# Patient Record
Sex: MALE
[De-identification: names, ages, dates, MRNs, and addresses within clinical notes are randomized per-mention and may not be internally consistent; named-entity substitution may affect disease eponyms.]

---

## 2019-03-01 ENCOUNTER — APPOINTMENT (OUTPATIENT)
Dept: FAMILY MEDICINE | Facility: CLINIC | Age: 19
End: 2019-03-01
Payer: COMMERCIAL

## 2019-03-01 VITALS
RESPIRATION RATE: 15 BRPM | SYSTOLIC BLOOD PRESSURE: 130 MMHG | WEIGHT: 150 LBS | DIASTOLIC BLOOD PRESSURE: 90 MMHG | TEMPERATURE: 98 F | HEIGHT: 68 IN | BODY MASS INDEX: 22.73 KG/M2 | OXYGEN SATURATION: 98 % | HEART RATE: 84 BPM

## 2019-03-01 DIAGNOSIS — Z86.59 PERSONAL HISTORY OF OTHER MENTAL AND BEHAVIORAL DISORDERS: ICD-10-CM

## 2019-03-01 DIAGNOSIS — Z83.438 FAMILY HISTORY OF OTHER DISORDER OF LIPOPROTEIN METABOLISM AND OTHER LIPIDEMIA: ICD-10-CM

## 2019-03-01 DIAGNOSIS — Z80.3 FAMILY HISTORY OF MALIGNANT NEOPLASM OF BREAST: ICD-10-CM

## 2019-03-01 DIAGNOSIS — Z87.891 PERSONAL HISTORY OF NICOTINE DEPENDENCE: ICD-10-CM

## 2019-03-01 DIAGNOSIS — Z11.3 ENCOUNTER FOR SCREENING FOR INFECTIONS WITH A PREDOMINANTLY SEXUAL MODE OF TRANSMISSION: ICD-10-CM

## 2019-03-01 DIAGNOSIS — R03.0 ELEVATED BLOOD-PRESSURE READING, W/OUT DIAGNOSIS OF HYPERTENSION: ICD-10-CM

## 2019-03-01 PROCEDURE — 99385 PREV VISIT NEW AGE 18-39: CPT

## 2019-03-03 PROBLEM — R03.0 BLOOD PRESSURE ELEVATED WITHOUT HISTORY OF HTN: Status: ACTIVE | Noted: 2019-03-03

## 2019-03-03 NOTE — HISTORY OF PRESENT ILLNESS
[de-identified] : Pt in office for CPE. Pt was dx w/ ADD 5 yrs ago. Pt used to be on adderall in the past but stopped due to AEs. Pt has had depression in the past, was on prozac in the past for 1 yr. Denies CP, palpitations, dyspnea, n/v.\par Exsmoker a few cig/day x 4 yrs, quit 2017. Vaping daily.  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 Sexually active 1-2 partners in past yr. Requests STD testing\par Declines flu vaccine or tdap vaccine.

## 2019-03-03 NOTE — ASSESSMENT
[FreeTextEntry1] : Elevated BP w/o hx of htn - Advised lifestyle modifications, f/u in 3-6 months for BP recheck. Start medication if still elevated at that time\par hx depression - controlled right now. If symptoms return advised pt to f/u in office\par Nicotine dependence - Vaping cessation discussed with pt for 5 minutes. Risks of vaping including development of pulmonary diseases and/or possible development of cancer were understood by the patient. Advised pt to wean off nicotine content in vape cartridge\par Healthy diet and regular exercise regimen discussed w/ pt.\par Screening labs ordered\par STD screen ordered\par Any questions call office

## 2020-01-09 ENCOUNTER — APPOINTMENT (OUTPATIENT)
Dept: FAMILY MEDICINE | Facility: CLINIC | Age: 20
End: 2020-01-09

## 2020-06-04 ENCOUNTER — APPOINTMENT (OUTPATIENT)
Dept: FAMILY MEDICINE | Facility: CLINIC | Age: 20
End: 2020-06-04
Payer: COMMERCIAL

## 2020-06-04 PROCEDURE — 99213 OFFICE O/P EST LOW 20 MIN: CPT | Mod: 95

## 2020-06-04 NOTE — ASSESSMENT
[FreeTextEntry1] : adult ADHD - start Adderall ER 10mg q day prn. Possible adverse effects discussed with pt.  checked. f/u in 1-2 months\par \par I have spent 15 minutes of time during this encounter including face to face time with patient and review of chart. >50% of time was spent counseling and/or coordinating care for above problems.

## 2020-06-04 NOTE — HISTORY OF PRESENT ILLNESS
[Home] : at home, [unfilled] , at the time of the visit. [Verbal consent obtained from patient] : the patient, [unfilled] [Medical Office: ([facility])___] : at the medical office located in  [FreeTextEntry8] : Pt has hx of ADD dx in middle school. Pt had stopped taking adderall about 6 years ago. Pt tolerated medication well overall but sometimes felt emotionally "numb" and had some decreased appetite. Recently at home due to COVID pandemic and pt unable to complete tasks at home or get started on projects. Pt feels he would benefit from restarting adderall again. Denies CP, palpitations, dyspnea, n/v

## 2021-01-26 ENCOUNTER — APPOINTMENT (OUTPATIENT)
Dept: FAMILY MEDICINE | Facility: CLINIC | Age: 21
End: 2021-01-26
Payer: COMMERCIAL

## 2021-01-26 PROCEDURE — 99213 OFFICE O/P EST LOW 20 MIN: CPT | Mod: 95

## 2021-01-26 RX ORDER — DEXTROAMPHETAMINE SACCHARATE, AMPHETAMINE ASPARTATE, DEXTROAMPHETAMINE SULFATE AND AMPHETAMINE SULFATE 2.5; 2.5; 2.5; 2.5 MG/1; MG/1; MG/1; MG/1
10 TABLET ORAL DAILY
Qty: 30 | Refills: 0 | Status: ACTIVE | COMMUNITY
Start: 2021-01-26 | End: 1900-01-01

## 2021-01-26 RX ORDER — HYDROCORTISONE 25 MG/ML
2.5 LOTION TOPICAL TWICE DAILY
Qty: 1 | Refills: 1 | Status: ACTIVE | COMMUNITY
Start: 2021-01-26 | End: 1900-01-01

## 2021-01-26 RX ORDER — CLOBETASOL PROPIONATE 0.5 MG/G
0.05 CREAM TOPICAL
Qty: 1 | Refills: 0 | Status: ACTIVE | COMMUNITY
Start: 2021-01-26 | End: 1900-01-01

## 2021-01-26 RX ORDER — DEXTROAMPHETAMINE SACCHARATE, AMPHETAMINE ASPARTATE MONOHYDRATE, DEXTROAMPHETAMINE SULFATE AND AMPHETAMINE SULFATE 2.5; 2.5; 2.5; 2.5 MG/1; MG/1; MG/1; MG/1
10 CAPSULE, EXTENDED RELEASE ORAL
Qty: 30 | Refills: 0 | Status: COMPLETED | COMMUNITY
Start: 2020-06-04 | End: 2021-01-26

## 2021-01-26 NOTE — HISTORY OF PRESENT ILLNESS
[Home] : at home, [unfilled] , at the time of the visit. [Medical Office: ([facility])___] : at the medical office located in  [Verbal consent obtained from patient] : the patient, [unfilled] [FreeTextEntry8] : Pt c/o skin inflammation/irritation x 2-3 weeks. Pt has been having inc flaking for past 2-3 weeks on ears, shoulders, hands, scalp. Skin has been very pruritic. Pt applying aquaphor otc cortisone 1% which helps slightly. Pt has hx of eczema. \par \par Pt for f/u of ADD. Pt tolerating current dose of Adderall well. Pt doing well concentrating when doing work. Denies CP, palpitations, HA, tremor, anorexia.

## 2021-01-26 NOTE — ASSESSMENT
[FreeTextEntry1] : eczema - clobetasol topically bid x 1 week then prn on hands, shoulders. hydrocortisone topically bid for scalp and ears prn. f/u derm\par adult ADHD - refill Adderall.  checked. pt needs to f/u and have BP checked in office before any further refills\par \par I have spent 20 minutes of time during this encounter including face to face time with patient and review of chart. >50% of time was spent counseling and/or coordinating care for above problems.

## 2021-02-17 ENCOUNTER — APPOINTMENT (OUTPATIENT)
Dept: DERMATOLOGY | Facility: CLINIC | Age: 21
End: 2021-02-17
Payer: COMMERCIAL

## 2021-02-17 DIAGNOSIS — L30.8 OTHER SPECIFIED DERMATITIS: ICD-10-CM

## 2021-02-17 PROCEDURE — 99203 OFFICE O/P NEW LOW 30 MIN: CPT | Mod: 95

## 2021-02-17 RX ORDER — KETOCONAZOLE 20.5 MG/ML
2 SHAMPOO, SUSPENSION TOPICAL
Qty: 1 | Refills: 5 | Status: ACTIVE | COMMUNITY
Start: 2021-02-17 | End: 1900-01-01

## 2021-02-17 RX ORDER — TRIAMCINOLONE ACETONIDE 1 MG/G
0.1 CREAM TOPICAL
Qty: 1 | Refills: 3 | Status: ACTIVE | COMMUNITY
Start: 2021-02-17 | End: 1900-01-01

## 2021-02-17 RX ORDER — BETAMETHASONE DIPROPIONATE 0.5 MG/ML
0.05 LOTION, AUGMENTED TOPICAL
Qty: 1 | Refills: 5 | Status: ACTIVE | COMMUNITY
Start: 2021-02-17 | End: 1900-01-01

## 2021-02-17 NOTE — CONSULT LETTER
[Dear  ___] : Dear  [unfilled], [Consult Letter:] : I had the pleasure of evaluating your patient, [unfilled]. [Consult Closing:] : Thank you very much for allowing me to participate in the care of this patient.  If you have any questions, please do not hesitate to contact me. [Sincerely,] : Sincerely, [FreeTextEntry2] : John Reyes, DO [FreeTextEntry1] : He has a recent flare of eczema, especially of the scalp, face, shoulders, and dorsa of the hands.\par \par Please see attached chart note for the details and treatment plan. [FreeTextEntry3] : Paul Ortega MD\par [address], [address]\par [place] [address]\par Tel ([phone])\par Fax ([phone])\par Private line ([phone])\par

## 2021-02-17 NOTE — PHYSICAL EXAM
[Alert] : alert [Oriented x 3] : ~L oriented x 3 [Well Nourished] : well nourished [FreeTextEntry3] : Photos examined and patient seen via live interaction over the computer\par \par Type II skin\par \par Scalp not able to be seen\par Diffuse mild pinkness and scaling present on the face and ears\par Focal ill-defined erythematous scaly patches present on the shoulders\par Moderate erythematous crusty patches present on the dorsum of both hands

## 2021-02-17 NOTE — ASSESSMENT
[FreeTextEntry1] : Rash is consistent with an eczematous dermatitis of some type, probably exacerbated on the dorsa of the hands from frequent hand washing

## 2021-02-17 NOTE — HISTORY OF PRESENT ILLNESS
[Home] : at home, [unfilled] , at the time of the visit. [Medical Office: ([facility])___] : at the medical office located in  [Verbal consent obtained from patient] : the patient, [unfilled] [FreeTextEntry1] : Eczema [de-identified] : First telehealth visit for 20-year-old white male referred by John Reyes, D.O., with few months history of a flare of eczema.  Very itchy, especially the scalp. Has difficulty sleeping unless he takes Benadryl.\par Recently treated by him with clobetasol cream 0.05% and 2 1/2% hydrocortisone lotion.  \par Rash on shoulders has improved with this treatment.\par Note: Patient washes his hands frequently.\par Has history of eczema as a child.\par \par Verbal consent obtained.\par \par This was a Telehealth encounter via [employer] in which two-way real-time audio and visual communication was utilized.  Risks and benefits of receiving Telehealth services has been discussed with the patient.   The patient has been given ample opportunity to discuss any questions regarding [facility] telehealth services.  All of the patient's questions have been answered to satisfaction.\par

## 2021-06-08 ENCOUNTER — NON-APPOINTMENT (OUTPATIENT)
Age: 21
End: 2021-06-08

## 2021-06-08 ENCOUNTER — APPOINTMENT (OUTPATIENT)
Dept: FAMILY MEDICINE | Facility: CLINIC | Age: 21
End: 2021-06-08
Payer: COMMERCIAL

## 2021-06-08 VITALS — SYSTOLIC BLOOD PRESSURE: 124 MMHG | DIASTOLIC BLOOD PRESSURE: 86 MMHG

## 2021-06-08 VITALS
OXYGEN SATURATION: 98 % | TEMPERATURE: 97.9 F | HEIGHT: 68 IN | BODY MASS INDEX: 26.83 KG/M2 | DIASTOLIC BLOOD PRESSURE: 80 MMHG | HEART RATE: 84 BPM | WEIGHT: 177 LBS | SYSTOLIC BLOOD PRESSURE: 124 MMHG

## 2021-06-08 DIAGNOSIS — Z00.00 ENCOUNTER FOR GENERAL ADULT MEDICAL EXAMINATION W/OUT ABNORMAL FINDINGS: ICD-10-CM

## 2021-06-08 DIAGNOSIS — Z13.228 ENCOUNTER FOR SCREENING FOR OTHER SUSPECTED ENDOCRINE DISORDER: ICD-10-CM

## 2021-06-08 DIAGNOSIS — F32.9 ANXIETY DISORDER, UNSPECIFIED: ICD-10-CM

## 2021-06-08 DIAGNOSIS — Z13.0 ENCOUNTER FOR SCREENING FOR DISEASES OF THE BLOOD AND BLOOD-FORMING ORGANS AND CERTAIN DISORDERS INVOLVING THE IMMUNE MECHANISM: ICD-10-CM

## 2021-06-08 DIAGNOSIS — F41.9 ANXIETY DISORDER, UNSPECIFIED: ICD-10-CM

## 2021-06-08 DIAGNOSIS — F17.200 NICOTINE DEPENDENCE, UNSPECIFIED, UNCOMPLICATED: ICD-10-CM

## 2021-06-08 DIAGNOSIS — L30.9 DERMATITIS, UNSPECIFIED: ICD-10-CM

## 2021-06-08 DIAGNOSIS — Z13.220 ENCOUNTER FOR SCREENING FOR LIPOID DISORDERS: ICD-10-CM

## 2021-06-08 DIAGNOSIS — Z13.29 ENCOUNTER FOR SCREENING FOR OTHER SUSPECTED ENDOCRINE DISORDER: ICD-10-CM

## 2021-06-08 DIAGNOSIS — Z13.0 ENCOUNTER FOR SCREENING FOR OTHER SUSPECTED ENDOCRINE DISORDER: ICD-10-CM

## 2021-06-08 DIAGNOSIS — F90.9 ATTENTION-DEFICIT HYPERACTIVITY DISORDER, UNSPECIFIED TYPE: ICD-10-CM

## 2021-06-08 PROCEDURE — 99072 ADDL SUPL MATRL&STAF TM PHE: CPT

## 2021-06-08 PROCEDURE — 99395 PREV VISIT EST AGE 18-39: CPT

## 2021-06-08 RX ORDER — NICOTINE TRANSDERMAL SYSTEM 21 MG/24H
21 PATCH, EXTENDED RELEASE TRANSDERMAL
Qty: 1 | Refills: 2 | Status: ACTIVE | COMMUNITY
Start: 2021-06-08 | End: 1900-01-01

## 2021-06-10 NOTE — HISTORY OF PRESENT ILLNESS
[de-identified] : Pt in office for CPE. Pt for f/u of ADD. Pt tolerating current dose of Adderall well. Pt doing well concentrating when doing work. Denies CP, palpitations, HA, tremor, anorexia.\par Pt has hx of depression. Pt feeling depressed daily. Pt has been to therapy in the past, stopped 1 yr ago. Denies SI. PHQ9 score 20 today\par Pt has eczema, sees Dr. Ortega, uses creams prn.\par Exsmoker a few cig/day x 4 yrs, quit 2017. Vaping daily.\par ETOH use social \par Drug use marijuana daily\par Exercises irregularly\par Diet balanced\par Works for [employer]\par Single, lives w/ gf. Sexually active\par Declines flu vaccine or tdap vaccine

## 2021-06-10 NOTE — ASSESSMENT
[FreeTextEntry1] : anxiety, depression - advised pt start seeing therapist. if no improvement pt should consider starting ssri\par eczema - cont topical steroids prn. f/u derm\par adult ADHD - cont Adderall prn.  checked. \par Nicotine dependence - Vaping cessation discussed with pt for 5 minutes. Risks of vaping including development of possible pulmonary disease were understood by the patient. start nicotine patch. Possible adverse effects discussed with pt. f/u in 1-2 mos if unable to quit to discuss other options\par Healthy diet and regular exercise regimen discussed w/ pt.\par Screening labs ordered\par Any questions call office